# Patient Record
Sex: MALE | Race: WHITE | Employment: FULL TIME
[De-identification: names, ages, dates, MRNs, and addresses within clinical notes are randomized per-mention and may not be internally consistent; named-entity substitution may affect disease eponyms.]

---

## 2019-11-05 ENCOUNTER — HEALTH MAINTENANCE LETTER (OUTPATIENT)
Age: 49
End: 2019-11-05

## 2020-11-22 ENCOUNTER — HEALTH MAINTENANCE LETTER (OUTPATIENT)
Age: 50
End: 2020-11-22

## 2021-07-29 ENCOUNTER — LAB (OUTPATIENT)
Dept: LAB | Facility: CLINIC | Age: 51
End: 2021-07-29
Payer: COMMERCIAL

## 2021-07-29 ENCOUNTER — OFFICE VISIT (OUTPATIENT)
Dept: CARDIOLOGY | Facility: CLINIC | Age: 51
End: 2021-07-29
Payer: COMMERCIAL

## 2021-07-29 VITALS
HEART RATE: 72 BPM | DIASTOLIC BLOOD PRESSURE: 96 MMHG | BODY MASS INDEX: 29.54 KG/M2 | WEIGHT: 211 LBS | HEIGHT: 71 IN | SYSTOLIC BLOOD PRESSURE: 156 MMHG

## 2021-07-29 DIAGNOSIS — E78.5 HYPERLIPIDEMIA LDL GOAL <130: ICD-10-CM

## 2021-07-29 DIAGNOSIS — Z13.6 CARDIOVASCULAR SCREENING; LDL GOAL LESS THAN 160: Primary | ICD-10-CM

## 2021-07-29 DIAGNOSIS — R03.0 ELEVATED BLOOD PRESSURE READING WITHOUT DIAGNOSIS OF HYPERTENSION: ICD-10-CM

## 2021-07-29 DIAGNOSIS — Z13.6 CARDIOVASCULAR SCREENING; LDL GOAL LESS THAN 160: ICD-10-CM

## 2021-07-29 LAB
ALT SERPL W P-5'-P-CCNC: 27 U/L (ref 0–70)
CHOLEST SERPL-MCNC: 212 MG/DL
FASTING STATUS PATIENT QL REPORTED: YES
HDLC SERPL-MCNC: 49 MG/DL
LDLC SERPL CALC-MCNC: 147 MG/DL
NONHDLC SERPL-MCNC: 163 MG/DL
TRIGL SERPL-MCNC: 80 MG/DL

## 2021-07-29 PROCEDURE — 93000 ELECTROCARDIOGRAM COMPLETE: CPT | Performed by: INTERNAL MEDICINE

## 2021-07-29 PROCEDURE — 84460 ALANINE AMINO (ALT) (SGPT): CPT | Performed by: INTERNAL MEDICINE

## 2021-07-29 PROCEDURE — 80061 LIPID PANEL: CPT | Performed by: INTERNAL MEDICINE

## 2021-07-29 PROCEDURE — 99204 OFFICE O/P NEW MOD 45 MIN: CPT | Performed by: INTERNAL MEDICINE

## 2021-07-29 PROCEDURE — 36415 COLL VENOUS BLD VENIPUNCTURE: CPT | Performed by: INTERNAL MEDICINE

## 2021-07-29 ASSESSMENT — MIFFLIN-ST. JEOR: SCORE: 1829.22

## 2021-07-29 NOTE — LETTER
7/29/2021    Henna Senior MD  [address] [address]    RE: Tera Jarrett       Dear Colleague,    I had the pleasure of seeing Tera Jarrett in the [facility].    HPI and Plan:     Tera Ratliff is a pleasant 51-year-old gentleman son of Pillo Ratliff who I previously cared for who is here for initial consultation evaluation.    Mr. Ratliff denies any history of microinfarction chest pain chest pressure stroke or any cardiovascular events.  He does have a family history with his father and siblings.  He exercises previously from Covid x5 days a week now he is down to 2 days a week mostly yoga or what he calls your aerobic yoga.  He has some joint Disick difficulty so heavy weightlifting is also difficult.    He has had borderline hypertension in the past but has never been diagnosed or treated with this.  Cholesterol check is from over 10 years ago.    Patient certainly has some cardiovascular risk factors.  And in order to identify 10-year risk we need to recheck fasting lipid profile and I would recommend checking a calcium score.  He has been fasting today.  In addition he is hypertensive today however we need more data for diagnosis and recommendations.  He will check his blood pressures at home over the next 2 weeks and call us or put him into MyChart these results.  Talked about nonpharmacological treatment including weight loss more regular exercise and decreasing alcohol content.  He drinks multiple days per week the 1 day/week up to 5 drinks or so per night.  He is the owner of [employer].    Orders Placed This Encounter   Procedures     Lipid Profile     ALT     EKG 12-lead complete w/read - Clinics (performed today)     No orders of the defined types were placed in this encounter.    There are no discontinued medications.      Encounter Diagnoses   Name  Primary?     CARDIOVASCULAR SCREENING; LDL GOAL LESS THAN 160 Yes     Elevated blood pressure reading without diagnosis of hypertension      Hyperlipidemia LDL goal <130        CURRENT MEDICATIONS:  Current Outpatient Medications   Medication Sig Dispense Refill     Ibuprofen (IBU-200 PO) Take  by mouth.         ALLERGIES     Allergies   Allergen Reactions     No Known Drug Allergies        PAST MEDICAL HISTORY:  Past Medical History:   Diagnosis Date     NO ACTIVE PROBLEMS        PAST SURGICAL HISTORY:  Past Surgical History:   Procedure Laterality Date     SURGICAL HISTORY OF -       [place] Teeth Extraction     SURGICAL HISTORY OF -       L arm fracture        FAMILY HISTORY:  Family History   Problem Relation Age of Onset     C.A.D. Father        SOCIAL HISTORY:  Social History     Socioeconomic History     Marital status:      Spouse name: Ann     Number of children: 2     Years of education: 16     Highest education level: None   Occupational History     Occupation:    Tobacco Use     Smoking status: Former Smoker     Packs/day: 0.50     Years: 10.00     Pack years: 5.00     Types: Cigarettes     Quit date: 1999     Years since quittin.8     Smokeless tobacco: Never Used   Substance and Sexual Activity     Alcohol use: Yes     Comment: 2 times a week     Drug use: No     Sexual activity: Yes     Partners: Female   Other Topics Concern     Parent/sibling w/ CABG, MI or angioplasty before 65F 55M? Not Asked   Social History Narrative     None     Social Determinants of Health     Financial Resource Strain:      Difficulty of Paying Living Expenses:    Food Insecurity:      Worried About Running Out of Food in the Last Year:      Ran Out of Food in the Last Year:    Transportation Needs:      Lack of Transportation (Medical):      Lack of Transportation (Non-Medical):    Physical Activity:      Days of Exercise per Week:      Minutes of Exercise per Session:    Stress:       "Feeling of Stress :    Social Connections:      Frequency of Communication with Friends and Family:      Frequency of Social Gatherings with Friends and Family:      Attends Cheondoism Services:      Active Member of Clubs or Organizations:      Attends Club or Organization Meetings:      Marital Status:    Intimate Partner Violence:      Fear of Current or Ex-Partner:      Emotionally Abused:      Physically Abused:      Sexually Abused:        Review of Systems:  Skin:  Negative     Eyes:  Negative    ENT:  Negative    Respiratory:  Negative    Cardiovascular:  Negative for;chest pain;palpitations;lightheadedness    Gastroenterology: Negative    Genitourinary:  Negative    Musculoskeletal:  Negative    Neurologic:  Negative    Psychiatric:  Negative    Heme/Lymph/Imm:  Negative    Endocrine:  Negative            Physical Exam:  Vitals: BP (!) 142/97   Pulse 76   Ht 1.803 m (5' 11\")   Wt 95.7 kg (211 lb)   BMI 29.43 kg/m    Constitutional: cooperative, alert and oriented, well developed, well nourished, in no acute distress   Skin: warm and dry to the touch, no apparent skin lesions or masses noted   Head: normocephalic, no masses or lesions   Eyes: pupils equal and round, conjunctivae and lids unremarkable, sclera white, no xanthalasma, EOMS intact, no nystagmus   ENT: no pallor or cyanosis, dentition good   Neck: carotid pulses are full and equal bilaterally, JVP normal, no carotid bruit, no thyromegaly   Chest: normal breath sounds, clear to auscultation, normal A-P diameter, normal symmetry, normal respiratory excursion, no use of accessory muscles   Cardiac: regular rhythm, normal S1/S2, no S3 or S4, apical impulse not displaced, no murmurs, gallops or rubs no presence of murmur   Abdomen: abdomen soft, non-tender, BS normoactive, no mass, no HSM, no bruits   Vascular: pulses full and equal, no bruits auscultated   Extremities and Back: no deformities, clubbing, cyanosis, erythema observed   Neurological: " affect appropriate, oriented to time, person and place     Recent Lab Results:  LIPID RESULTS:  Lab Results   Component Value Date    CHOL 198 03/04/2003    HDL 38 (L) 03/04/2003     (H) 03/04/2003    TRIG 112 03/04/2003    CHOLHDLRATIO 5 03/04/2003       LIVER ENZYME RESULTS:  No results found for: AST, ALT    CBC RESULTS:  No results found for: WBC, RBC, HGB, HCT, MCV, MCH, MCHC, RDW, PLT    BMP RESULTS:  Lab Results   Component Value Date     02/17/2011    POTASSIUM 4.0 02/17/2011    CHLORIDE 103 02/17/2011    CO2 27 02/17/2011    ANIONGAP 13 02/17/2011    GLC 91 02/17/2011    BUN 15 02/17/2011    CR 0.83 02/17/2011    GFRESTIMATED >90 02/17/2011    GFRESTBLACK >90 02/17/2011    MICHAEL 9.3 02/17/2011        A1C RESULTS:  No results found for: A1C    INR RESULTS:  No results found for: INR        CC  No referring provider defined for this encounter.                      Thank you for allowing me to participate in the care of your patient.      Sincerely,     IVY MORALES MD     [facility]  cc:   No referring provider defined for this encounter.

## 2021-07-29 NOTE — PROGRESS NOTES
HPI and Plan:     Tera Ratliff is a pleasant 51-year-old gentleman son of Pillo Ratliff who I previously cared for who is here for initial consultation evaluation.    Mr. Ratliff denies any history of microinfarction chest pain chest pressure stroke or any cardiovascular events.  He does have a family history with his father and siblings.  He exercises previously from Covid x5 days a week now he is down to 2 days a week mostly yoga or what he calls your aerobic yoga.  He has some joint Disick difficulty so heavy weightlifting is also difficult.    He has had borderline hypertension in the past but has never been diagnosed or treated with this.  Cholesterol check is from over 10 years ago.    Patient certainly has some cardiovascular risk factors.  And in order to identify 10-year risk we need to recheck fasting lipid profile and I would recommend checking a calcium score.  He has been fasting today.  In addition he is hypertensive today however we need more data for diagnosis and recommendations.  He will check his blood pressures at home over the next 2 weeks and call us or put him into MyChart these results.  Talked about nonpharmacological treatment including weight loss more regular exercise and decreasing alcohol content.  He drinks multiple days per week the 1 day/week up to 5 drinks or so per night.  He is the owner of [employer].    Orders Placed This Encounter   Procedures     Lipid Profile     ALT     EKG 12-lead complete w/read - Clinics (performed today)     No orders of the defined types were placed in this encounter.    There are no discontinued medications.      Encounter Diagnoses   Name Primary?     CARDIOVASCULAR SCREENING; LDL GOAL LESS THAN 160 Yes     Elevated blood pressure reading without diagnosis of hypertension      Hyperlipidemia LDL goal <130        CURRENT MEDICATIONS:  Current Outpatient Medications   Medication Sig Dispense Refill     Ibuprofen (IBU-200  PO) Take  by mouth.         ALLERGIES     Allergies   Allergen Reactions     No Known Drug Allergies        PAST MEDICAL HISTORY:  Past Medical History:   Diagnosis Date     NO ACTIVE PROBLEMS        PAST SURGICAL HISTORY:  Past Surgical History:   Procedure Laterality Date     SURGICAL HISTORY OF -       [place] Teeth Extraction     SURGICAL HISTORY OF -       L arm fracture        FAMILY HISTORY:  Family History   Problem Relation Age of Onset     C.A.D. Father        SOCIAL HISTORY:  Social History     Socioeconomic History     Marital status:      Spouse name: Ann     Number of children: 2     Years of education: 16     Highest education level: None   Occupational History     Occupation:    Tobacco Use     Smoking status: Former Smoker     Packs/day: 0.50     Years: 10.00     Pack years: 5.00     Types: Cigarettes     Quit date: 1999     Years since quittin.8     Smokeless tobacco: Never Used   Substance and Sexual Activity     Alcohol use: Yes     Comment: 2 times a week     Drug use: No     Sexual activity: Yes     Partners: Female   Other Topics Concern     Parent/sibling w/ CABG, MI or angioplasty before 65F 55M? Not Asked   Social History Narrative     None     Social Determinants of Health     Financial Resource Strain:      Difficulty of Paying Living Expenses:    Food Insecurity:      Worried About Running Out of Food in the Last Year:      Ran Out of Food in the Last Year:    Transportation Needs:      Lack of Transportation (Medical):      Lack of Transportation (Non-Medical):    Physical Activity:      Days of Exercise per Week:      Minutes of Exercise per Session:    Stress:      Feeling of Stress :    Social Connections:      Frequency of Communication with Friends and Family:      Frequency of Social Gatherings with Friends and Family:      Attends Protestant Services:      Active Member of Clubs or Organizations:      Attends Club or Organization Meetings:   "    Marital Status:    Intimate Partner Violence:      Fear of Current or Ex-Partner:      Emotionally Abused:      Physically Abused:      Sexually Abused:        Review of Systems:  Skin:  Negative     Eyes:  Negative    ENT:  Negative    Respiratory:  Negative    Cardiovascular:  Negative for;chest pain;palpitations;lightheadedness    Gastroenterology: Negative    Genitourinary:  Negative    Musculoskeletal:  Negative    Neurologic:  Negative    Psychiatric:  Negative    Heme/Lymph/Imm:  Negative    Endocrine:  Negative            Physical Exam:  Vitals: BP (!) 142/97   Pulse 76   Ht 1.803 m (5' 11\")   Wt 95.7 kg (211 lb)   BMI 29.43 kg/m    Constitutional: cooperative, alert and oriented, well developed, well nourished, in no acute distress   Skin: warm and dry to the touch, no apparent skin lesions or masses noted   Head: normocephalic, no masses or lesions   Eyes: pupils equal and round, conjunctivae and lids unremarkable, sclera white, no xanthalasma, EOMS intact, no nystagmus   ENT: no pallor or cyanosis, dentition good   Neck: carotid pulses are full and equal bilaterally, JVP normal, no carotid bruit, no thyromegaly   Chest: normal breath sounds, clear to auscultation, normal A-P diameter, normal symmetry, normal respiratory excursion, no use of accessory muscles   Cardiac: regular rhythm, normal S1/S2, no S3 or S4, apical impulse not displaced, no murmurs, gallops or rubs no presence of murmur   Abdomen: abdomen soft, non-tender, BS normoactive, no mass, no HSM, no bruits   Vascular: pulses full and equal, no bruits auscultated   Extremities and Back: no deformities, clubbing, cyanosis, erythema observed   Neurological: affect appropriate, oriented to time, person and place     Recent Lab Results:  LIPID RESULTS:  Lab Results   Component Value Date    CHOL 198 03/04/2003    HDL 38 (L) 03/04/2003     (H) 03/04/2003    TRIG 112 03/04/2003    CHOLHDLRATIO 5 03/04/2003       LIVER ENZYME " RESULTS:  No results found for: AST, ALT    CBC RESULTS:  No results found for: WBC, RBC, HGB, HCT, MCV, MCH, MCHC, RDW, PLT    BMP RESULTS:  Lab Results   Component Value Date     02/17/2011    POTASSIUM 4.0 02/17/2011    CHLORIDE 103 02/17/2011    CO2 27 02/17/2011    ANIONGAP 13 02/17/2011    GLC 91 02/17/2011    BUN 15 02/17/2011    CR 0.83 02/17/2011    GFRESTIMATED >90 02/17/2011    GFRESTBLACK >90 02/17/2011    MICHAEL 9.3 02/17/2011        A1C RESULTS:  No results found for: A1C    INR RESULTS:  No results found for: INR        CC  No referring provider defined for this encounter.

## 2021-08-04 ENCOUNTER — HOSPITAL ENCOUNTER (OUTPATIENT)
Dept: CARDIOLOGY | Facility: CLINIC | Age: 51
Discharge: HOME | End: 2021-08-04
Attending: INTERNAL MEDICINE | Admitting: INTERNAL MEDICINE
Payer: COMMERCIAL

## 2021-08-04 DIAGNOSIS — R03.0 ELEVATED BLOOD PRESSURE READING WITHOUT DIAGNOSIS OF HYPERTENSION: ICD-10-CM

## 2021-08-04 DIAGNOSIS — Z13.6 CARDIOVASCULAR SCREENING; LDL GOAL LESS THAN 160: ICD-10-CM

## 2021-08-04 DIAGNOSIS — E78.5 HYPERLIPIDEMIA LDL GOAL <130: ICD-10-CM

## 2021-08-04 PROCEDURE — 75571 CT HRT W/O DYE W/CA TEST: CPT

## 2021-08-04 PROCEDURE — 75571 CT HRT W/O DYE W/CA TEST: CPT | Mod: 26 | Performed by: INTERNAL MEDICINE

## 2021-08-05 ENCOUNTER — CARE COORDINATION (OUTPATIENT)
Dept: CARDIOLOGY | Facility: CLINIC | Age: 51
End: 2021-08-05

## 2021-08-05 NOTE — PROGRESS NOTES
Dr. Boyd has reviewed patient's CT coronary calcium score of 84.  Lipid results were also reviewed showing LDL = 147, HDL = 40, cholesterol 212 and trigs = 80.  The plan per Dr. Boyd is:  1.  Start lipitor 40 mg daily  2.  Recheck lipids in 3 months  3.  Start ASA 81 mg daily  I have called patient and left VM instructing him to call back to discuss further.    Patient called back to report his BP readings have been running on average 130/90's.  I explained to him the importance of treating his lipids and BP as a prevention from MI or stroke.  He states he would like to try and exercise, eat right to stay off medications.  He will have repeat lipids in 4 months and video visit with Dr. Boyd at that time.  Pt. will keep track of his BP readings also.  He will call if he has any questions.

## 2021-08-11 DIAGNOSIS — E78.5 HYPERLIPIDEMIA LDL GOAL <130: Primary | ICD-10-CM

## 2021-09-19 ENCOUNTER — HEALTH MAINTENANCE LETTER (OUTPATIENT)
Age: 51
End: 2021-09-19

## 2021-12-23 ENCOUNTER — TELEPHONE (OUTPATIENT)
Dept: CARDIOLOGY | Facility: CLINIC | Age: 51
End: 2021-12-23

## 2021-12-23 ENCOUNTER — VIRTUAL VISIT (OUTPATIENT)
Dept: CARDIOLOGY | Facility: CLINIC | Age: 51
End: 2021-12-23
Payer: COMMERCIAL

## 2021-12-23 DIAGNOSIS — R03.0 ELEVATED BLOOD PRESSURE READING WITHOUT DIAGNOSIS OF HYPERTENSION: ICD-10-CM

## 2021-12-23 DIAGNOSIS — E78.5 HYPERLIPIDEMIA LDL GOAL <130: Primary | ICD-10-CM

## 2021-12-23 PROCEDURE — 99214 OFFICE O/P EST MOD 30 MIN: CPT | Mod: GT | Performed by: INTERNAL MEDICINE

## 2021-12-23 RX ORDER — ROSUVASTATIN CALCIUM 5 MG/1
5 TABLET, COATED ORAL DAILY
Qty: 30 TABLET | Refills: 11 | Status: SHIPPED | OUTPATIENT
Start: 2021-12-23 | End: 2023-01-19

## 2021-12-23 NOTE — PROGRESS NOTES
"Review Of Systems  Skin: negative  Eyes: negative  Ears/Nose/Throat: negative  Respiratory: No shortness of breath, dyspnea on exertion, cough, or hemoptysis  Cardiovascular: negative  Gastrointestinal: negative  Genitourinary: negative  Musculoskeletal: negative  Neurologic: negative  Psychiatric: negative  Hematologic/Lymphatic/Immunologic: negative  Endocrine: negative    Vitals - Patient Reported  Systolic (Patient Reported):  (has not been checking VS at home.)  Weight (Patient Reported): 88.5 kg (195 lb)  Height (Patient Reported): 180.3 cm (5' 11\")  BMI (Based on Pt Reported Ht/Wt): 27.2    Reviewed:  UNRULY Saavedra  12/23/2021    Tera is a 51 year old who is being evaluated via a billable video visit.    Mr. Mariee is a very pleasant 51-year-old gentleman who I previously saw in July as he has risk factors for coronary disease including strong family history with his father and brother.  He has borderline hypertension with blood pressures checked at home between 130-140/80-90.  Also has high cholesterol with an LDL cholesterol checked in July of 147 after trial of diet and exercise where he was successful in losing weight his LDL actually went up to 150.  In 2003 it was in the 130s.  He continues to be active exercising as stated with no symptoms.  He drinks alcohol in moderation and moderate dosing of approximately 1 day/week up to 5 drinks or so per night.  He is the owner of [employer].    We had a long discussion about his cholesterol.  After I think he has not been successful in lowering his LDL to acceptable levels with diet and exercise and lifestyle moderation.  His calcium score was puts him in the mid 80 percentile.  Because of this we are starting Crestor 5 mg daily we will have him come back for follow-up with cholesterol check liver function in 2 to 3 months time.  "

## 2021-12-23 NOTE — LETTER
"12/23/2021    Henna Senior MD  [address] [address]    RE: Tera Jarrett       Dear Colleague,     I had the pleasure of seeing Tera Jarrett in the [facility].  Review Of Systems  Skin: negative  Eyes: negative  Ears/Nose/Throat: negative  Respiratory: No shortness of breath, dyspnea on exertion, cough, or hemoptysis  Cardiovascular: negative  Gastrointestinal: negative  Genitourinary: negative  Musculoskeletal: negative  Neurologic: negative  Psychiatric: negative  Hematologic/Lymphatic/Immunologic: negative  Endocrine: negative    Vitals - Patient Reported  Systolic (Patient Reported):  (has not been checking VS at home.)  Weight (Patient Reported): 88.5 kg (195 lb)  Height (Patient Reported): 180.3 cm (5' 11\")  BMI (Based on Pt Reported Ht/Wt): 27.2    Reviewed:  UNRULY Saavedra  12/23/2021    Tera is a 51 year old who is being evaluated via a billable video visit.    Mr. Mariee is a very pleasant 51-year-old gentleman who I previously saw in July as he has risk factors for coronary disease including strong family history with his father and brother.  He has borderline hypertension with blood pressures checked at home between 130-140/80-90.  Also has high cholesterol with an LDL cholesterol checked in July of 147 after trial of diet and exercise where he was successful in losing weight his LDL actually went up to 150.  In 2003 it was in the 130s.  He continues to be active exercising as stated with no symptoms.  He drinks alcohol in moderation and moderate dosing of approximately 1 day/week up to 5 drinks or so per night.  He is the owner of [employer].    We had a long discussion about his cholesterol.  After I think he has not been successful in lowering his LDL to acceptable levels with diet and exercise and lifestyle moderation.  His calcium score was puts him in the mid 80 percentile.  Because of this we are starting " Crestor 5 mg daily we will have him come back for follow-up with cholesterol check liver function in 2 to 3 months time.      IVY MORALES MD   [facility]      cc:   Henna Senior MD  [address] [address]

## 2021-12-23 NOTE — TELEPHONE ENCOUNTER
Patient called wondering what the name of the vitamin Dr. Boyd recommended to take with the Crestor.  It is Co Q10.

## 2022-01-09 ENCOUNTER — HEALTH MAINTENANCE LETTER (OUTPATIENT)
Age: 52
End: 2022-01-09

## 2022-02-07 ENCOUNTER — TELEPHONE (OUTPATIENT)
Dept: CARDIOLOGY | Facility: CLINIC | Age: 52
End: 2022-02-07
Payer: COMMERCIAL

## 2022-02-07 NOTE — TELEPHONE ENCOUNTER
Patient called wondering when he needs lab work after starting Crestor.  I have instructed him to get a fasting lipid profile in 3 months from starting Crestor per Dr. Boyd.  Patient will have this done through his PCP and then send results to our clinic.

## 2022-04-05 ENCOUNTER — TELEPHONE (OUTPATIENT)
Dept: CARDIOLOGY | Facility: CLINIC | Age: 52
End: 2022-04-05
Payer: COMMERCIAL

## 2022-04-05 NOTE — TELEPHONE ENCOUNTER
Patient called to state that his PCP office needed order to draw fasting lipid today.    [facility] [phone] to obtain fax number. Order faxed to [phone]    Elizabeth Guo RN  [facility]

## 2022-11-20 ENCOUNTER — HEALTH MAINTENANCE LETTER (OUTPATIENT)
Age: 52
End: 2022-11-20

## 2023-01-03 DIAGNOSIS — E78.5 HYPERLIPIDEMIA LDL GOAL <130: Primary | ICD-10-CM

## 2023-01-18 ENCOUNTER — LAB (OUTPATIENT)
Dept: LAB | Facility: CLINIC | Age: 53
End: 2023-01-18
Payer: COMMERCIAL

## 2023-01-18 ENCOUNTER — OFFICE VISIT (OUTPATIENT)
Dept: CARDIOLOGY | Facility: CLINIC | Age: 53
End: 2023-01-18
Payer: COMMERCIAL

## 2023-01-18 VITALS
DIASTOLIC BLOOD PRESSURE: 90 MMHG | BODY MASS INDEX: 28.14 KG/M2 | SYSTOLIC BLOOD PRESSURE: 140 MMHG | OXYGEN SATURATION: 97 % | WEIGHT: 201 LBS | HEIGHT: 71 IN | HEART RATE: 63 BPM

## 2023-01-18 DIAGNOSIS — Z13.6 CARDIOVASCULAR SCREENING; LDL GOAL LESS THAN 160: ICD-10-CM

## 2023-01-18 DIAGNOSIS — E78.5 HYPERLIPIDEMIA LDL GOAL <130: Primary | ICD-10-CM

## 2023-01-18 DIAGNOSIS — E78.5 HYPERLIPIDEMIA LDL GOAL <130: ICD-10-CM

## 2023-01-18 DIAGNOSIS — R03.0 ELEVATED BLOOD PRESSURE READING WITHOUT DIAGNOSIS OF HYPERTENSION: ICD-10-CM

## 2023-01-18 LAB
ALT SERPL W P-5'-P-CCNC: 17 U/L (ref 10–50)
CHOLEST SERPL-MCNC: 156 MG/DL
HDLC SERPL-MCNC: 46 MG/DL
LDLC SERPL CALC-MCNC: 97 MG/DL
NONHDLC SERPL-MCNC: 110 MG/DL
TRIGL SERPL-MCNC: 66 MG/DL

## 2023-01-18 PROCEDURE — 84460 ALANINE AMINO (ALT) (SGPT): CPT | Performed by: INTERNAL MEDICINE

## 2023-01-18 PROCEDURE — 36415 COLL VENOUS BLD VENIPUNCTURE: CPT | Performed by: INTERNAL MEDICINE

## 2023-01-18 PROCEDURE — 80061 LIPID PANEL: CPT | Performed by: INTERNAL MEDICINE

## 2023-01-18 PROCEDURE — 99214 OFFICE O/P EST MOD 30 MIN: CPT | Performed by: INTERNAL MEDICINE

## 2023-01-18 NOTE — LETTER
1/18/2023    Sahra S [facility] Steve [address]  [facility] [address]    RE: Tera Jarrett       Dear Colleague,     I had the pleasure of seeing Tera Jarrett in the [facility].  HPI and Plan:     Tera Jarrett is a pleasant 53 y/o male with borderline hypertension, hyperlipidemia, calcium score elevated in 80th percentile started on Crestor approximately 1 year ago.      No chest pain, SOB, edema, palpitations, syncope.    He is here for a routine annual follow-up and fasting lipid profile.  Unfortunately labs not back yet.  He is asymptomatic.  Of note he is the son of Pillo Ratliff who I followed for many years and follow many members of his family.    Patient is stable from cardiac standpoint.  Recommend continued Crestor.  We talked about diet and exercise.  We will follow-up in 1 year with repeat lipid profile.  Probably get a stress test in 2 to 3 years.  Time.    Today's clinic visit entailed:  Review of the result(s) of each unique test - labs  Ordering of each unique test  Prescription drug management  30 minutes spent on the date of the encounter doing chart review and review of test results   Provider  Link to MDM Help Grid     The level of medical decision making during this visit was of moderate complexity.      No orders of the defined types were placed in this encounter.    No orders of the defined types were placed in this encounter.    There are no discontinued medications.      Encounter Diagnoses   Name Primary?     Hyperlipidemia LDL goal <130 Yes     Elevated blood pressure reading without diagnosis of hypertension      CARDIOVASCULAR SCREENING; LDL GOAL LESS THAN 160        CURRENT MEDICATIONS:  Current Outpatient Medications   Medication Sig Dispense Refill     rosuvastatin (CRESTOR) 5 MG tablet Take 1 tablet (5 mg) by mouth daily 30 tablet 11       ALLERGIES     Allergies   Allergen Reactions     No Known Drug Allergies        PAST MEDICAL HISTORY:  Past Medical  "History:   Diagnosis Date     NO ACTIVE PROBLEMS        PAST SURGICAL HISTORY:  Past Surgical History:   Procedure Laterality Date     SURGICAL HISTORY OF -       [place] Teeth Extraction     SURGICAL HISTORY OF -       L arm fracture        FAMILY HISTORY:  Family History   Problem Relation Age of Onset     C.A.D. Father        SOCIAL HISTORY:  Social History     Socioeconomic History     Marital status:      Spouse name: Ann     Number of children: 2     Years of education: 16     Highest education level: None   Occupational History     Occupation:    Tobacco Use     Smoking status: Former     Packs/day: 0.50     Years: 10.00     Pack years: 5.00     Types: Cigarettes     Quit date: 1999     Years since quittin.3     Smokeless tobacco: Never   Substance and Sexual Activity     Alcohol use: Yes     Comment: 2 times a week     Drug use: No     Sexual activity: Yes     Partners: Female       Review of Systems:  Skin:        Eyes:       ENT:       Respiratory:  Negative    Cardiovascular:  Negative    Gastroenterology:      Genitourinary:       Musculoskeletal:       Neurologic:       Psychiatric:       Heme/Lymph/Imm:       Endocrine:  Negative thyroid disorder;diabetes    Physical Exam:  Vitals: BP (!) 140/90   Pulse 63   Ht 1.803 m (5' 11\")   Wt 91.2 kg (201 lb)   SpO2 97%   BMI 28.03 kg/m      Constitutional:           Skin:             Head:           Eyes:           Lymph:      ENT:           Neck:           Respiratory:            Cardiac:                                                           GI:           Extremities and Muscular Skeletal:                 Neurological:           Psych:         Recent Lab Results:  LIPID RESULTS:  Lab Results   Component Value Date    CHOL 212 (H) 2021    CHOL 198 2003    HDL 49 2021    HDL 38 (L) 2003     (H) 2021     (H) 2003    TRIG 78 2022    TRIG 112 2003    " CHOLHDLRATIO 5 03/04/2003       LIVER ENZYME RESULTS:  Lab Results   Component Value Date    ALT 27 07/29/2021       CBC RESULTS:  No results found for: WBC, RBC, HGB, HCT, MCV, MCH, MCHC, RDW, PLT    BMP RESULTS:  Lab Results   Component Value Date     02/17/2011    POTASSIUM 4.0 02/17/2011    CHLORIDE 103 02/17/2011    CO2 27 02/17/2011    ANIONGAP 13 02/17/2011    GLC 91 02/17/2011    BUN 15 02/17/2011    CR 0.83 02/17/2011    GFRESTIMATED >90 02/17/2011    GFRESTBLACK >90 02/17/2011    MICHAEL 9.3 02/17/2011        A1C RESULTS:  No results found for: A1C    INR RESULTS:  No results found for: INR        CC  Aram Morales MD  [id number]  [place] [id number]    Thank you for allowing me to participate in the care of your patient.      Sincerely,     ARAM MORALES MD     [facility]

## 2023-01-18 NOTE — PROGRESS NOTES
HPI and Plan:     Tera Jarrett is a pleasant 51 y/o male with borderline hypertension, hyperlipidemia, calcium score elevated in 80th percentile started on Crestor approximately 1 year ago.      No chest pain, SOB, edema, palpitations, syncope.    He is here for a routine annual follow-up and fasting lipid profile.  Unfortunately labs not back yet.  He is asymptomatic.  Of note he is the son of Pillo Ratliff who I followed for many years and follow many members of his family.    Patient is stable from cardiac standpoint.  Recommend continued Crestor.  We talked about diet and exercise.  We will follow-up in 1 year with repeat lipid profile.  Probably get a stress test in 2 to 3 years.  Time.    Today's clinic visit entailed:  Review of the result(s) of each unique test - labs  Ordering of each unique test  Prescription drug management  30 minutes spent on the date of the encounter doing chart review and review of test results   Provider  Link to [facility] Help Grid     The level of medical decision making during this visit was of moderate complexity.      No orders of the defined types were placed in this encounter.    No orders of the defined types were placed in this encounter.    There are no discontinued medications.      Encounter Diagnoses   Name Primary?     Hyperlipidemia LDL goal <130 Yes     Elevated blood pressure reading without diagnosis of hypertension      CARDIOVASCULAR SCREENING; LDL GOAL LESS THAN 160        CURRENT MEDICATIONS:  Current Outpatient Medications   Medication Sig Dispense Refill     rosuvastatin (CRESTOR) 5 MG tablet Take 1 tablet (5 mg) by mouth daily 30 tablet 11       ALLERGIES     Allergies   Allergen Reactions     No Known Drug Allergies        PAST MEDICAL HISTORY:  Past Medical History:   Diagnosis Date     NO ACTIVE PROBLEMS        PAST SURGICAL HISTORY:  Past Surgical History:   Procedure Laterality Date     SURGICAL HISTORY OF -       [place] Teeth Extraction     SURGICAL HISTORY  "OF -       L arm fracture        FAMILY HISTORY:  Family History   Problem Relation Age of Onset     C.A.D. Father        SOCIAL HISTORY:  Social History     Socioeconomic History     Marital status:      Spouse name: Ann     Number of children: 2     Years of education: 16     Highest education level: None   Occupational History     Occupation:    Tobacco Use     Smoking status: Former     Packs/day: 0.50     Years: 10.00     Pack years: 5.00     Types: Cigarettes     Quit date: 1999     Years since quittin.3     Smokeless tobacco: Never   Substance and Sexual Activity     Alcohol use: Yes     Comment: 2 times a week     Drug use: No     Sexual activity: Yes     Partners: Female       Review of Systems:  Skin:        Eyes:       ENT:       Respiratory:  Negative    Cardiovascular:  Negative    Gastroenterology:      Genitourinary:       Musculoskeletal:       Neurologic:       Psychiatric:       Heme/Lymph/Imm:       Endocrine:  Negative thyroid disorder;diabetes    Physical Exam:  Vitals: BP (!) 140/90   Pulse 63   Ht 1.803 m (5' 11\")   Wt 91.2 kg (201 lb)   SpO2 97%   BMI 28.03 kg/m      Constitutional:           Skin:             Head:           Eyes:           Lymph:      ENT:           Neck:           Respiratory:            Cardiac:                                                           GI:           Extremities and Muscular Skeletal:                 Neurological:           Psych:         Recent Lab Results:  LIPID RESULTS:  Lab Results   Component Value Date    CHOL 212 (H) 2021    CHOL 198 2003    HDL 49 2021    HDL 38 (L) 2003     (H) 2021     (H) 2003    TRIG 78 2022    TRIG 112 2003    CHOLHDLRATIO 5 2003       LIVER ENZYME RESULTS:  Lab Results   Component Value Date    ALT 27 2021       CBC RESULTS:  No results found for: WBC, RBC, HGB, HCT, MCV, MCH, MCHC, RDW, PLT    BMP " RESULTS:  Lab Results   Component Value Date     02/17/2011    POTASSIUM 4.0 02/17/2011    CHLORIDE 103 02/17/2011    CO2 27 02/17/2011    ANIONGAP 13 02/17/2011    GLC 91 02/17/2011    BUN 15 02/17/2011    CR 0.83 02/17/2011    GFRESTIMATED >90 02/17/2011    GFRESTBLACK >90 02/17/2011    MICHAEL 9.3 02/17/2011        A1C RESULTS:  No results found for: A1C    INR RESULTS:  No results found for: INR        CC  Aram Boyd MD  [id number]  [place] [id number]

## 2023-01-19 DIAGNOSIS — E78.5 HYPERLIPIDEMIA LDL GOAL <130: ICD-10-CM

## 2023-01-19 DIAGNOSIS — R03.0 ELEVATED BLOOD PRESSURE READING WITHOUT DIAGNOSIS OF HYPERTENSION: ICD-10-CM

## 2023-01-19 RX ORDER — ROSUVASTATIN CALCIUM 5 MG/1
5 TABLET, COATED ORAL DAILY
Qty: 90 TABLET | Refills: 3 | Status: SHIPPED | OUTPATIENT
Start: 2023-01-19 | End: 2024-01-22

## 2023-04-15 ENCOUNTER — HEALTH MAINTENANCE LETTER (OUTPATIENT)
Age: 53
End: 2023-04-15

## 2024-01-22 DIAGNOSIS — E78.5 HYPERLIPIDEMIA LDL GOAL <130: ICD-10-CM

## 2024-01-22 DIAGNOSIS — R03.0 ELEVATED BLOOD PRESSURE READING WITHOUT DIAGNOSIS OF HYPERTENSION: ICD-10-CM

## 2024-01-22 RX ORDER — ROSUVASTATIN CALCIUM 5 MG/1
5 TABLET, COATED ORAL DAILY
Qty: 90 TABLET | Refills: 0 | Status: SHIPPED | OUTPATIENT
Start: 2024-01-22

## 2024-06-22 ENCOUNTER — HEALTH MAINTENANCE LETTER (OUTPATIENT)
Age: 54
End: 2024-06-22

## 2025-07-12 ENCOUNTER — HEALTH MAINTENANCE LETTER (OUTPATIENT)
Age: 55
End: 2025-07-12